# Patient Record
(demographics unavailable — no encounter records)

---

## 2024-12-02 NOTE — DISCUSSION/SUMMARY
[FreeTextEntry1] : 66 yo F with known thyroid Ca s/p partial thyroidectomy, CAD (Ca score 336), osteopenia (on Actonel) and HLD presents for follow up.   #Secondary prevention #CAD #ASCVD risk optimization CAD (Ca score 336) - c/w Crestor 40mg daily; LDL well controlled at 62 (goal <70) - c/w Aspirin 81 mg daily - Lp(a) today - Encouraged patient to continue healthy exercise and eating habits, focusing on a Mediterranean style of eating w/ more plant-based foods in general, and aiming for the recommended 150 minutes per week of moderate physical activity.  #PreDM: A1c 6.0% - following w/ Dr. Dowling  - emphasize lifestyle modifications particularly reduction in sugar consumption as above  # Pre-operative assessment- Patient is stable to proceed to upcoming colonoscopy without further work-up since she has excellent exercise capacity and no symptoms. She can continue on aspirin if acceptable from a bleeding risk.   Follow up with Dr Sin in 6 months in person.  [EKG obtained to assist in diagnosis and management of assessed problem(s)] : EKG obtained to assist in diagnosis and management of assessed problem(s)

## 2024-12-02 NOTE — REASON FOR VISIT
[CV Risk Factors and Non-Cardiac Disease] : CV risk factors and non-cardiac disease [Hyperlipidemia] : hyperlipidemia [Coronary Artery Disease] : coronary artery disease

## 2024-12-02 NOTE — PHYSICAL EXAM
[Well Developed] : well developed [Well Nourished] : well nourished [No Acute Distress] : no acute distress [Normal Venous Pressure] : normal venous pressure [No Carotid Bruit] : no carotid bruit [Normal S1, S2] : normal S1, S2 [No Murmur] : no murmur [No Rub] : no rub [No Gallop] : no gallop [Clear Lung Fields] : clear lung fields [Good Air Entry] : good air entry [No Respiratory Distress] : no respiratory distress  [Soft] : abdomen soft [Non Tender] : non-tender [No Masses/organomegaly] : no masses/organomegaly [Normal Bowel Sounds] : normal bowel sounds [No Edema] : no edema

## 2024-12-02 NOTE — HISTORY OF PRESENT ILLNESS
Patient Seen in: [facility] Emergency Department      History   Patient presents with:  Eval-P    Stated Complaint: delusional/eval P x 3 weeks    HPI/Subjective:   HPI    49-year-old female brought in by family for worsening paranoia over the last Exam     ED Triage Vitals [06/14/21 1738]   /84   Pulse 73   Resp 18   Temp 97.4 °F (36.3 °C)   Temp src Temporal   SpO2 99 %   O2 Device None (Room air)       Current:BP (!) 181/85   Pulse 66   Temp 97.4 °F (36.3 °C) (Temporal)   Resp 16   Ht 162.6 RSV can be similar. Test performed using the Xpert Xpress SARS-CoV-2/FLU/RSV assay on the [address] [place] [address], [address] [place] [address]. This test is being used under the Food and Drug Administration's Emergency Use Authorization.     The Oc Fuentes PM [FreeTextEntry1] : 66 yo F with known thyroid Ca s/p partial thyroidectomy, CAD (Ca score 336), osteopenia (on Actonel) and HLD presents for follow up.   Feels well at this time. Reports adherence with medications.  No SOB, chest pain, MALDONADO, orthopnea, PND.   =========================== Prior history  She is feeling well. Reports limited alcohol intake, good exercise tolerance, and well-balanced diet. Patient denies any chest pain, SOB, palpitations, orthopnea, PND or LE edema.  She is adherent w/ all her meds and tolerating them well.   =============Data================= 12/19: , , HDL 81, LDL 62 11/22: 164 HDL 86 LDL 59 TG 95, A1c 5.8%  Labs from 11/30/21 after switch to Crestor 40mg from simvastatin 10mg:,  TG 93 HDL 85 LDL 48 8/22/22:  TG 60 HDL 93 LDL 58, HbA1c 6.0% Nov 2024 lipids  HDL 94 LDL 51 TG 66; a1c 6.0%  Cardiac Diagnostics: 11-12/2020 Ca score: 336 (, LCx 30, RCA 90) Carotid Duplex: No Carotid artery stenosis Ex NUC: 9 mins, 93% THR, 10.1 METS. No EKG changes. Distal, small, mildly reversible defect in the apex anterior, apical septum and apical wall. EF increased to 80%, no WMA, no TID ---> CTA coronary was not covered by insurance TTE 10/19/22: normal LV size and function; LVEF 60-65%; no significant valvular disease ASCVD: July 15 2021: LDL 76, HDL 89, Tchol 178, TAG 64 TTE 11/30/2020: normal global and regional LV and RV systolic function; LVEF 66%; trace MR; mild TR.

## 2024-12-02 NOTE — CARDIOLOGY SUMMARY
[de-identified] : TTE 11/30/2020: normal global and regional LV and RV systolic function; LVEF 66%; trace MR; mild TR.

## 2025-01-28 NOTE — SIGNATURES
[TextEntry] : This note was authored by Sudarshan Cid working as a scribe for Dr. Alicia Magdaleno.   I, Dr. Alicia Magdaleno, have reviewed the content of this note and confirm it is true and accurate. I personally performed the history and physical examination and made all the decisions. 01/28/2025

## 2025-01-28 NOTE — HISTORY OF PRESENT ILLNESS
[Patient reported mammogram was normal] : Patient reported mammogram was normal [Patient reported breast sonogram was normal] : Patient reported breast sonogram was normal [Patient reported colonoscopy was normal] : Patient reported colonoscopy was normal [FreeTextEntry1] : 2025. LAVINIA MATTHEWS 67-year-old female  LMP 50s. She presents for an annual gyn exam.  Patient recently started Vagifem and coconut oil for vaginal atrophy.  She denies abdominal and pelvic pain. No abnormal vaginal bleeding, vaginal discharge, or vaginitis symptoms. She reports normal urination, no dysuria, no incontinence of urine. BM is normal per patient, no blood in stool or constipation/diarrhea.  Followed by surgical onc Dr. Cormier.   PHQ: 0.  No new medical conditions, medications, or surgeries since last visit.  GynHx: low grade dysplasia, HPV pos, fibroids, vaginal atrophy, ovarian cyst PMH: HLD, thyroid ca, osteopenia SHx: C/S, oral surgery, hemithyroidectomy Allergies: anesthesia, PNC, mycins Meds: Vit D, baby ASA, Actonel, Crestor FHx: Denies FHx of breast, ovarian, uterine or colon cancer. [TextBox_4] : Pelvic sono today 1/2025: thin endo 1mm, normal ovaries w/ small R ovarian cyst stable, stable fibroid [Mammogramdate] : 11/24 [BreastSonogramDate] : 11/24 [BoneDensityDate] : 01/25 [TextBox_37] : osteopenia, normal frax. Sees endo, on Actonel.  [ColonoscopyDate] : 12/24

## 2025-01-28 NOTE — PLAN
[FreeTextEntry1] : Routine Gyn Exam: GynHx: low grade dysplasia, HPV pos, fibroids, vaginal atrophy, ovarian cyst PMH: osteopenia, SHx: C/S BSE taught. Rx given for mammogram and breast sonogram du 11/25 (last 11/24) Advised pt. to schedule colonoscopy 12/29 (last 12/24).  Osteopenia, normal frax: Pt currently on Actonel Advised pt to f/u w/ endocrinologist  Ovarian Cysts/Fibroid: Stable on today's sono Advised pt to schedule pelvic sono to assess stability 1/26 (last today)  Vaginal Atrophy: Continue w/ Vagifem twice a week Rx given for Estrace cream, use small amount externally daily - all R/B reviewed  RTO in 1 year or PRN.

## 2025-01-28 NOTE — PHYSICAL EXAM
[Chaperone Present] : A chaperone was present in the examining room during all aspects of the physical examination [[id number]] : A chaperone was present during the pelvic exam. [Appropriately responsive] : appropriately responsive [Alert] : alert [No Acute Distress] : no acute distress [No Lymphadenopathy] : no lymphadenopathy [Regular Rate Rhythm] : regular rate rhythm [No Murmurs] : no murmurs [Clear to Auscultation B/L] : clear to auscultation bilaterally [Soft] : soft [Non-tender] : non-tender [Non-distended] : non-distended [No HSM] : No HSM [No Lesions] : no lesions [No Mass] : no mass [Oriented x3] : oriented x3 [Examination Of The Breasts] : a normal appearance [No Masses] : no breast masses were palpable [Vulvar Atrophy] : vulvar atrophy [Labia Majora] : normal [Labia Minora] : normal [Atrophy] : atrophy [Normal] : normal [Uterine Adnexae] : normal [FreeTextEntry2] : Sudarshan Cid [FreeTextEntry6] : b/l accessory breast tissue in axilla [FreeTextEntry9] : Guaiac negative, no masses

## 2025-02-05 NOTE — ASSESSMENT
[FreeTextEntry1] : Osteoporosis. Continue risedronate for 6 more months (for 5 years total) and then will give drug holiday.  Repeat bone density in 2 years. Check bone marker today. Continue weight bearing exercises  and vitamin D supplementation.  She seems to be getting enough calcium from her diet (normal Ca and PTH in past labs).  s/p L hemithyroidectomy for thyroid cancer .  Euthyroid. continue follow up with Dr Nemo MARVIN 2 years

## 2025-02-05 NOTE — PHYSICAL EXAM
[Alert] : alert [Healthy Appearance] : healthy appearance [No Proptosis] : no proptosis [No Lid Lag] : no lid lag [Normal Hearing] : hearing was normal [No LAD] : no lymphadenopathy [Clear to Auscultation] : lungs were clear to auscultation bilaterally [Normal S1, S2] : normal S1 and S2 [Regular Rhythm] : with a regular rhythm [Normal Affect] : the affect was normal [Normal Mood] : the mood was normal [Kyphosis] : no kyphosis present [Scoliosis] : no scoliosis [Acanthosis Nigricans] : no acanthosis nigricans [de-identified] : thyroid not palpable

## 2025-02-05 NOTE — DATA REVIEWED
[FreeTextEntry1] : 7/24  TSH 1.55 11/23  Ca 10, PTH 45,  BSAP 7.9, TSH 2.00 12/21 TSH 2.56, 25D 56.4, A1c 6.0%, tot chol 152, trig 93, HDL 85, LDL 48 7/21: TSH 1.97, tot chol 178, trig 64, HDL 89, LDL 73 (direct) 7/20: TSH 3.48, 25D 40.4, Ca 9.8,  11/19: TSH 1.43  thyroid sono, 11/20:  s/p L nato.  R gland normal sized, homogenous. no nodules.  bone density, 1/25 L1-L4, T -1.3,  prev -1.4 (7/22)  -1.7 (2020,lowest T -2.3, in L4), -1.4 in 2018, -1.3 in 2015, -1.0 in 2013 L neck T -2.3, prev -2.2 (7/22)  -2.3 (2020),-2.0 in 2018, -1.7 in 2015, -1.8 in 2013 R neck T -2.4, prev  -2.2 (7/22) -2.6 (2020), -2.0, in 2018, -1.9 in 2015, -2.0 in 2013 L hip T -1.5, prev -1.4 (7/22), -2.0 (2020); R hip -1.7, prev -1.6 (7/22),  -2.2 (2020)

## 2025-02-05 NOTE — HISTORY OF PRESENT ILLNESS
[FreeTextEntry1] : no health issues since last visit.  Here to review bone density. She is trying to exercise more -- strength, barre, Pilates. She has a lot of dairy in her diet -- fortified/enriched milk and yogurt every day. She sees Dr Chester  every 2 years bone density reviewed from 1/25:  T scores -1.3 in spine, -2.3 in fem neck, -1.5 in tot hip  PMH:  follicular Ca with hurthle cell changes.  2.5cm s/p L nato 2020 osteoporosis, started on risedronate Sept 2020.  no fractures.  Meds: risedronate 150mg/month rosuvastatin 40mg vitamin D 2000/day